# Patient Record
Sex: MALE | Race: WHITE | NOT HISPANIC OR LATINO | Employment: FULL TIME | URBAN - METROPOLITAN AREA
[De-identification: names, ages, dates, MRNs, and addresses within clinical notes are randomized per-mention and may not be internally consistent; named-entity substitution may affect disease eponyms.]

---

## 2023-09-22 ENCOUNTER — TELEPHONE (OUTPATIENT)
Dept: UROLOGY | Facility: CLINIC | Age: 23
End: 2023-09-22
Payer: COMMERCIAL

## 2023-09-22 NOTE — TELEPHONE ENCOUNTER
Call was placed to patient informing him did he complete any SA. He stated he has completed 2 SA at [facility] in [place].

## 2023-09-22 NOTE — TELEPHONE ENCOUNTER
----- Message from Aaron Garcia sent at 9/22/2023  9:47 AM CDT -----  Regarding: returning call  Contact: pt  Pt requesting call back RE: returning call to staff      Confirmed contact below:  Contact Name:Zhao Sy  Phone Number: [phone]

## 2023-09-27 ENCOUNTER — OFFICE VISIT (OUTPATIENT)
Dept: UROLOGY | Facility: CLINIC | Age: 23
End: 2023-09-27
Payer: COMMERCIAL

## 2023-09-27 ENCOUNTER — LAB VISIT (OUTPATIENT)
Dept: LAB | Facility: HOSPITAL | Age: 23
End: 2023-09-27
Attending: UROLOGY
Payer: COMMERCIAL

## 2023-09-27 VITALS
WEIGHT: 165.38 LBS | SYSTOLIC BLOOD PRESSURE: 108 MMHG | HEART RATE: 64 BPM | DIASTOLIC BLOOD PRESSURE: 70 MMHG | HEIGHT: 70 IN | BODY MASS INDEX: 23.68 KG/M2

## 2023-09-27 DIAGNOSIS — E29.1 TESTICULAR FAILURE: ICD-10-CM

## 2023-09-27 DIAGNOSIS — I86.1 VARICOCELE: ICD-10-CM

## 2023-09-27 DIAGNOSIS — E29.1 TESTICULAR FAILURE: Primary | ICD-10-CM

## 2023-09-27 LAB
ESTRADIOL SERPL-MCNC: 25 PG/ML (ref 11–44)
FSH SERPL-ACNC: 7.23 MIU/ML (ref 0.95–11.95)
LH SERPL-ACNC: 2.8 MIU/ML (ref 0.6–12.1)
PROLACTIN SERPL IA-MCNC: 7.5 NG/ML (ref 3.5–19.4)
TESTOST SERPL-MCNC: 628 NG/DL (ref 304–1227)

## 2023-09-27 PROCEDURE — 3008F PR BODY MASS INDEX (BMI) DOCUMENTED: ICD-10-PCS | Mod: CPTII,S$GLB,, | Performed by: UROLOGY

## 2023-09-27 PROCEDURE — 3074F SYST BP LT 130 MM HG: CPT | Mod: CPTII,S$GLB,, | Performed by: UROLOGY

## 2023-09-27 PROCEDURE — 83002 ASSAY OF GONADOTROPIN (LH): CPT | Performed by: UROLOGY

## 2023-09-27 PROCEDURE — 84403 ASSAY OF TOTAL TESTOSTERONE: CPT | Performed by: UROLOGY

## 2023-09-27 PROCEDURE — 83001 ASSAY OF GONADOTROPIN (FSH): CPT | Performed by: UROLOGY

## 2023-09-27 PROCEDURE — 36415 COLL VENOUS BLD VENIPUNCTURE: CPT | Performed by: UROLOGY

## 2023-09-27 PROCEDURE — 84146 ASSAY OF PROLACTIN: CPT | Performed by: UROLOGY

## 2023-09-27 PROCEDURE — 1159F MED LIST DOCD IN RCRD: CPT | Mod: CPTII,S$GLB,, | Performed by: UROLOGY

## 2023-09-27 PROCEDURE — 1159F PR MEDICATION LIST DOCUMENTED IN MEDICAL RECORD: ICD-10-PCS | Mod: CPTII,S$GLB,, | Performed by: UROLOGY

## 2023-09-27 PROCEDURE — 3074F PR MOST RECENT SYSTOLIC BLOOD PRESSURE < 130 MM HG: ICD-10-PCS | Mod: CPTII,S$GLB,, | Performed by: UROLOGY

## 2023-09-27 PROCEDURE — 3008F BODY MASS INDEX DOCD: CPT | Mod: CPTII,S$GLB,, | Performed by: UROLOGY

## 2023-09-27 PROCEDURE — 99999 PR PBB SHADOW E&M-EST. PATIENT-LVL III: CPT | Mod: PBBFAC,,, | Performed by: UROLOGY

## 2023-09-27 PROCEDURE — 99204 PR OFFICE/OUTPT VISIT, NEW, LEVL IV, 45-59 MIN: ICD-10-PCS | Mod: S$GLB,,, | Performed by: UROLOGY

## 2023-09-27 PROCEDURE — 99999 PR PBB SHADOW E&M-EST. PATIENT-LVL III: ICD-10-PCS | Mod: PBBFAC,,, | Performed by: UROLOGY

## 2023-09-27 PROCEDURE — 3078F PR MOST RECENT DIASTOLIC BLOOD PRESSURE < 80 MM HG: ICD-10-PCS | Mod: CPTII,S$GLB,, | Performed by: UROLOGY

## 2023-09-27 PROCEDURE — 3078F DIAST BP <80 MM HG: CPT | Mod: CPTII,S$GLB,, | Performed by: UROLOGY

## 2023-09-27 PROCEDURE — 99204 OFFICE O/P NEW MOD 45 MIN: CPT | Mod: S$GLB,,, | Performed by: UROLOGY

## 2023-09-27 PROCEDURE — 82670 ASSAY OF TOTAL ESTRADIOL: CPT | Performed by: UROLOGY

## 2023-09-27 NOTE — LETTER
September 27, 2023        Perez Bailey MD  [address] [place] [address] Dr Yasmany MAKI [id number]             [facility]  [address]  [place] [address]  Phone: [phone]   Patient: Zhao Sy   MR Number: [id number]   YOB: 2000   Date of Visit: 9/27/2023       Dear Dr. Bailey:    Thank you for referring Zhao Sy to me for evaluation. Attached you will find relevant portions of my assessment and plan of care.    If you have questions, please do not hesitate to call me. I look forward to following Zhao Sy along with you.    Sincerely,      Igor Fortune MD            CC  No Recipients    Enclosure

## 2023-09-27 NOTE — H&P (VIEW-ONLY)
"Chief Complaint:  Infertility    HPI:    Mr. Sy is a 23 y.o.  male who has been  to his wife for the past 3 years. They have been trying to achieve a pregnancy for the past 1 years but without success. 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 He denies a history of erectile dysfunction and ejaculatory problems.    He has achieved 0 pregnancies in the past.    SA 23 (EVAN)--4.6 cc/8.55 million per cc/60%/4%  SA 23 (EVAN)--6.6 cc/7.2 million per cc/41%/4%    Sammie Sy is 24 years old. ( 99) Her menses are regular. She has not undergone prior hysterosalpingogram. She has achieved 0 prior pregnancies.  She sees Dr. Bailey.    The couple has not undergone prior intrauterine insemination procedures.    The couple has not undergone prior in-vitro fertilization procedures.    Zhao Sy denies a history of exposure to harmful chemicals, toxins, and radiation.    No history of recent fevers greater than 101.5 degrees Farenheit.    No history of recent exposure to "wet heat."    No history of urological trauma or testicular torsion.    No history of prostatitis, epididymitis, and orchitis.    No history of post-pubertal mumps.    There is no known family history of fertility problems.    REVIEW OF SYSTEMS:     He denies headache, blurred vision, fever, nausea, vomiting, chills, abdominal pain, chest pain, sore throat, bleeding per rectum, cough, SOB, recent loss of consciousness, recent mental status changes, seizures, dizziness, or upper or lower extremity weakness.    PHYSICAL EXAM:     The patient generally appears in good health, is appropriately interactive, and is in no apparent distress.     Eyes: anicteric sclerae, moist conjunctivae; no lid-lag; PERRLA     HENT: Atraumatic; oropharynx clear with moist mucous membranes and no mucosal ulcerations;normal hard and soft palate.  No evidence of " "lymphadenopathy.    Neck: Trachea midline.  No thyromegaly.    Skin: No lesions.    Mental: Cooperative with normal affect.  Is oriented to time, place, and person.    Neuro: Grossly intact.    Chest: Normal inspiratory effort.   No accessory muscles.  No audible wheezes.  Respirations symmetric on inspiration and expiration.    Heart: Regular rhythm.      Abdomen:  Soft, non-tender. No masses or organomegaly. Bladder is not palpable. No evidence of flank discomfort. No evidence of inguinal hernia.    Genitourinary: Penis is normal with no evidence of plaques or induration. Urethral meatus is normal. Scrotum is normal. Testes are descended bilaterally with no evidence of abnormal masses or tenderness. Epididymis, vas deferens, and cord structures are normal bilaterally.  Testicular volume is approximately 18 cc on the R and 17 cc on the L.  He a L grade II varicocele.    Extremities: No cyanosis, clubbing, or edema.    IMPRESSION & PLAN:    Mr. Sy is a 23 y.o.  male who has been  to his wife for the past 3 years. They have been trying to achieve a pregnancy for the past 1 years but without success. 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 He denies a history of erectile dysfunction and ejaculatory problems.    He has achieved 0 pregnancies in the past.    SA 7/20/23 (EVAN)--4.6 cc/8.55 million per cc/60%/4%  SA 9/12/23 (EVAN)--6.6 cc/7.2 million per cc/41%/4%    He a L grade II varicocele.    1.  FSH, LH, testosterone, prolactin, and estradiol serum levels today.  Will phone review.  2.  Independently interpreted his outside SA's today.   3.  Discussed varicocele's potential impact on fertility.  Recommend correction.  4.  Recommend avoiding "wet heat."  5.  Recommend taking a multivitamin and 500 mg of vitamin c daily in addition to the multivitamin.  6.  Please send a copy of the note to Dr. Bailey.  Thank you for the " consultation.  7. We discussed the risks and benefits of varicocele repair.  We specifically addressed the chance of failure to improve semen parameters, bleeding, infection, pain, loss of testicle, damage to the vas.  We discussed this amongst other risks and discussed alternatives.  He was given the chance to ask questions.  Will schedule for elective varicocele repair.      CC: Lynn

## 2023-10-02 ENCOUNTER — TELEPHONE (OUTPATIENT)
Dept: UROLOGY | Facility: CLINIC | Age: 23
End: 2023-10-02
Payer: COMMERCIAL

## 2023-10-02 DIAGNOSIS — I86.1 VARICOCELE: Primary | ICD-10-CM

## 2023-10-09 NOTE — PRE-PROCEDURE INSTRUCTIONS
The following was discussed with pt via phone and sent to pt portal. Pt verbalized understanding.    Dear Zhao ,    You are scheduled for a procedure with Dr. Fortune on 10/10/2023. Your scheduled arrival time is 5:15am.  This arrival time is roughly 2 hours before your anticipated procedure time to allow sufficient time for pre-op.  Please wear comfortable clothes.  This procedure will take place at the [facility] at the corner of [facility].  [facility] [place] [facility] to [facility].  The address is:    [address] [place] [address].  [place] [address]    After entering the building, you will proceed to the second floor where you can check in with registration. You should take any medications that you routinely take for blood pressure (other than those listed below), heart medications, thyroid, cholesterol, etc.     If you wear contact lenses, please wear glasses to your procedure.    Your fasting instructions are as follow:  Nothing to eat or drink after midnight tonight. You may have small amounts of water to take medications in the morning. You MUST have a responsible adult to bring you home.      This evening (10/9/2023) and tomorrow morning, please hold the following medications:  -Aspirin and Aspirin-containing products (Goody's powder, Excedrin)  -NSAIDs (Advil, Ibuprofen, Aleve, Diclofenac)  -Vitamins/Supplements  -Herbal remedies/Teas  -Stimulants (Adderall, Vyvanse, Adipex)  -Diabetic medication (Please bring with you day of procedure)    -May take Tylenol    This evening (10/9/2023) and tomorrow morning, take a shower using antibacterial soap (ex: Hibiclens or Dial antibacterial soap). DO NOT apply deodorant, lotion, cologne, or anything else to the skin. Wear loose, comfortable fitting clothing. Do not wear jewelry or bring any valuables with you. If you wear dentures or contacts, please bring your case with you or leave them at home.    If you have  any procedure-specific questions, please call your surgeon's office. Any other questions, don't hesitate to call at [phone].    Thanks,  ARMAAN Cai  Pre-Admit Dept [facility]

## 2023-10-10 ENCOUNTER — HOSPITAL ENCOUNTER (OUTPATIENT)
Facility: HOSPITAL | Age: 23
Discharge: HOME | End: 2023-10-10
Attending: UROLOGY | Admitting: UROLOGY
Payer: COMMERCIAL

## 2023-10-10 ENCOUNTER — ANESTHESIA (OUTPATIENT)
Dept: SURGERY | Facility: HOSPITAL | Age: 23
End: 2023-10-10
Payer: COMMERCIAL

## 2023-10-10 ENCOUNTER — ANESTHESIA EVENT (OUTPATIENT)
Dept: SURGERY | Facility: HOSPITAL | Age: 23
End: 2023-10-10
Payer: COMMERCIAL

## 2023-10-10 VITALS
DIASTOLIC BLOOD PRESSURE: 65 MMHG | TEMPERATURE: 99 F | RESPIRATION RATE: 13 BRPM | HEART RATE: 69 BPM | OXYGEN SATURATION: 100 % | SYSTOLIC BLOOD PRESSURE: 125 MMHG

## 2023-10-10 DIAGNOSIS — R86.9 ABNORMAL SEMEN ANALYSIS: ICD-10-CM

## 2023-10-10 DIAGNOSIS — I86.1 VARICOCELE: Primary | ICD-10-CM

## 2023-10-10 PROCEDURE — 63600175 PHARM REV CODE 636 W HCPCS: Performed by: NURSE ANESTHETIST, CERTIFIED REGISTERED

## 2023-10-10 PROCEDURE — 99900035 HC TECH TIME PER 15 MIN (STAT)

## 2023-10-10 PROCEDURE — 25000003 PHARM REV CODE 250: Performed by: NURSE ANESTHETIST, CERTIFIED REGISTERED

## 2023-10-10 PROCEDURE — D9220A PRA ANESTHESIA: ICD-10-PCS | Mod: ,,, | Performed by: NURSE ANESTHETIST, CERTIFIED REGISTERED

## 2023-10-10 PROCEDURE — 76998 US GUIDE INTRAOP: CPT | Mod: 26,,, | Performed by: UROLOGY

## 2023-10-10 PROCEDURE — 37000009 HC ANESTHESIA EA ADD 15 MINS: Performed by: UROLOGY

## 2023-10-10 PROCEDURE — 36000706: Performed by: UROLOGY

## 2023-10-10 PROCEDURE — 37000008 HC ANESTHESIA 1ST 15 MINUTES: Performed by: UROLOGY

## 2023-10-10 PROCEDURE — 55530 PR EXCISE VARICOCELE: ICD-10-PCS | Mod: LT,,, | Performed by: UROLOGY

## 2023-10-10 PROCEDURE — D9220A PRA ANESTHESIA: Mod: ,,, | Performed by: NURSE ANESTHETIST, CERTIFIED REGISTERED

## 2023-10-10 PROCEDURE — 76998 PR  ULTRASONIC GUIDANCE, INTRAOPERATIVE: ICD-10-PCS | Mod: 26,,, | Performed by: UROLOGY

## 2023-10-10 PROCEDURE — 71000015 HC POSTOP RECOV 1ST HR: Performed by: UROLOGY

## 2023-10-10 PROCEDURE — 25000003 PHARM REV CODE 250: Performed by: UROLOGY

## 2023-10-10 PROCEDURE — 71000033 HC RECOVERY, INTIAL HOUR: Performed by: UROLOGY

## 2023-10-10 PROCEDURE — 36000707: Performed by: UROLOGY

## 2023-10-10 PROCEDURE — 55530 REVISE SPERMATIC CORD VEINS: CPT | Mod: LT,,, | Performed by: UROLOGY

## 2023-10-10 PROCEDURE — 94761 N-INVAS EAR/PLS OXIMETRY MLT: CPT

## 2023-10-10 RX ORDER — DEXAMETHASONE SODIUM PHOSPHATE 4 MG/ML
INJECTION, SOLUTION INTRA-ARTICULAR; INTRALESIONAL; INTRAMUSCULAR; INTRAVENOUS; SOFT TISSUE
Status: DISCONTINUED | OUTPATIENT
Start: 2023-10-10 | End: 2023-10-10

## 2023-10-10 RX ORDER — ROCURONIUM BROMIDE 10 MG/ML
INJECTION, SOLUTION INTRAVENOUS
Status: DISCONTINUED | OUTPATIENT
Start: 2023-10-10 | End: 2023-10-10

## 2023-10-10 RX ORDER — LIDOCAINE HYDROCHLORIDE 10 MG/ML
INJECTION, SOLUTION EPIDURAL; INFILTRATION; INTRACAUDAL; PERINEURAL
Status: DISCONTINUED | OUTPATIENT
Start: 2023-10-10 | End: 2023-10-10 | Stop reason: HOSPADM

## 2023-10-10 RX ORDER — FENTANYL CITRATE 50 UG/ML
INJECTION, SOLUTION INTRAMUSCULAR; INTRAVENOUS
Status: DISCONTINUED | OUTPATIENT
Start: 2023-10-10 | End: 2023-10-10

## 2023-10-10 RX ORDER — CEFAZOLIN SODIUM 1 G/3ML
INJECTION, POWDER, FOR SOLUTION INTRAMUSCULAR; INTRAVENOUS
Status: DISCONTINUED | OUTPATIENT
Start: 2023-10-10 | End: 2023-10-10

## 2023-10-10 RX ORDER — KETAMINE HCL IN 0.9 % NACL 50 MG/5 ML
SYRINGE (ML) INTRAVENOUS
Status: DISCONTINUED | OUTPATIENT
Start: 2023-10-10 | End: 2023-10-10

## 2023-10-10 RX ORDER — SODIUM CHLORIDE 0.9 % (FLUSH) 0.9 %
3 SYRINGE (ML) INJECTION
Status: DISCONTINUED | OUTPATIENT
Start: 2023-10-10 | End: 2023-10-10 | Stop reason: HOSPADM

## 2023-10-10 RX ORDER — LIDOCAINE HYDROCHLORIDE 20 MG/ML
INJECTION INTRAVENOUS
Status: DISCONTINUED | OUTPATIENT
Start: 2023-10-10 | End: 2023-10-10

## 2023-10-10 RX ORDER — PROPOFOL 10 MG/ML
VIAL (ML) INTRAVENOUS
Status: DISCONTINUED | OUTPATIENT
Start: 2023-10-10 | End: 2023-10-10

## 2023-10-10 RX ORDER — HYDROMORPHONE HYDROCHLORIDE 1 MG/ML
0.2 INJECTION, SOLUTION INTRAMUSCULAR; INTRAVENOUS; SUBCUTANEOUS EVERY 5 MIN PRN
Status: DISCONTINUED | OUTPATIENT
Start: 2023-10-10 | End: 2023-10-10 | Stop reason: HOSPADM

## 2023-10-10 RX ORDER — ONDANSETRON 2 MG/ML
4 INJECTION INTRAMUSCULAR; INTRAVENOUS ONCE AS NEEDED
Status: DISCONTINUED | OUTPATIENT
Start: 2023-10-10 | End: 2023-10-10 | Stop reason: HOSPADM

## 2023-10-10 RX ORDER — OXYCODONE HYDROCHLORIDE 5 MG/1
5 TABLET ORAL
Status: DISCONTINUED | OUTPATIENT
Start: 2023-10-10 | End: 2023-10-10 | Stop reason: HOSPADM

## 2023-10-10 RX ORDER — CEFAZOLIN SODIUM 1 G/3ML
2 INJECTION, POWDER, FOR SOLUTION INTRAMUSCULAR; INTRAVENOUS
Status: ACTIVE | OUTPATIENT
Start: 2023-10-10

## 2023-10-10 RX ORDER — DEXMEDETOMIDINE HYDROCHLORIDE 100 UG/ML
INJECTION, SOLUTION INTRAVENOUS
Status: DISCONTINUED | OUTPATIENT
Start: 2023-10-10 | End: 2023-10-10

## 2023-10-10 RX ORDER — MIDAZOLAM HYDROCHLORIDE 1 MG/ML
INJECTION, SOLUTION INTRAMUSCULAR; INTRAVENOUS
Status: DISCONTINUED | OUTPATIENT
Start: 2023-10-10 | End: 2023-10-10

## 2023-10-10 RX ORDER — PHENYLEPHRINE HCL IN 0.9% NACL 1 MG/10 ML
SYRINGE (ML) INTRAVENOUS
Status: DISCONTINUED | OUTPATIENT
Start: 2023-10-10 | End: 2023-10-10

## 2023-10-10 RX ORDER — EPHEDRINE SULFATE 50 MG/ML
INJECTION, SOLUTION INTRAVENOUS
Status: DISCONTINUED | OUTPATIENT
Start: 2023-10-10 | End: 2023-10-10

## 2023-10-10 RX ORDER — ONDANSETRON 2 MG/ML
INJECTION INTRAMUSCULAR; INTRAVENOUS
Status: DISCONTINUED | OUTPATIENT
Start: 2023-10-10 | End: 2023-10-10

## 2023-10-10 RX ORDER — HYDROCODONE BITARTRATE AND ACETAMINOPHEN 5; 325 MG/1; MG/1
1 TABLET ORAL EVERY 6 HOURS PRN
Qty: 5 TABLET | Refills: 0 | Status: SHIPPED | OUTPATIENT
Start: 2023-10-10 | End: 2024-02-19

## 2023-10-10 RX ORDER — MEPERIDINE HYDROCHLORIDE 50 MG/ML
12.5 INJECTION INTRAMUSCULAR; INTRAVENOUS; SUBCUTANEOUS ONCE AS NEEDED
Status: DISCONTINUED | OUTPATIENT
Start: 2023-10-10 | End: 2023-10-10 | Stop reason: HOSPADM

## 2023-10-10 RX ADMIN — CEFAZOLIN 2 G: 330 INJECTION, POWDER, FOR SOLUTION INTRAMUSCULAR; INTRAVENOUS at 07:10

## 2023-10-10 RX ADMIN — PROPOFOL 190 MG: 10 INJECTION, EMULSION INTRAVENOUS at 07:10

## 2023-10-10 RX ADMIN — DEXMEDETOMIDINE 8 MCG: 100 INJECTION, SOLUTION INTRAVENOUS at 08:10

## 2023-10-10 RX ADMIN — LIDOCAINE HYDROCHLORIDE 20 MG: 20 INJECTION INTRAVENOUS at 08:10

## 2023-10-10 RX ADMIN — DEXAMETHASONE SODIUM PHOSPHATE 8 MG: 4 INJECTION INTRA-ARTICULAR; INTRALESIONAL; INTRAMUSCULAR; INTRAVENOUS; SOFT TISSUE at 07:10

## 2023-10-10 RX ADMIN — ROCURONIUM BROMIDE 40 MG: 10 INJECTION, SOLUTION INTRAVENOUS at 07:10

## 2023-10-10 RX ADMIN — EPHEDRINE SULFATE 10 MG: 50 INJECTION INTRAVENOUS at 07:10

## 2023-10-10 RX ADMIN — Medication 100 MCG: at 07:10

## 2023-10-10 RX ADMIN — SODIUM CHLORIDE: 0.9 INJECTION, SOLUTION INTRAVENOUS at 06:10

## 2023-10-10 RX ADMIN — LIDOCAINE HYDROCHLORIDE 80 MG: 20 INJECTION INTRAVENOUS at 07:10

## 2023-10-10 RX ADMIN — SUGAMMADEX 150 MG: 100 INJECTION, SOLUTION INTRAVENOUS at 08:10

## 2023-10-10 RX ADMIN — FENTANYL CITRATE 50 MCG: 50 INJECTION INTRAMUSCULAR; INTRAVENOUS at 07:10

## 2023-10-10 RX ADMIN — ROCURONIUM BROMIDE 20 MG: 10 INJECTION, SOLUTION INTRAVENOUS at 07:10

## 2023-10-10 RX ADMIN — DEXMEDETOMIDINE 12 MCG: 100 INJECTION, SOLUTION INTRAVENOUS at 06:10

## 2023-10-10 RX ADMIN — Medication 50 MCG: at 07:10

## 2023-10-10 RX ADMIN — MIDAZOLAM 2 MG: 1 INJECTION INTRAMUSCULAR; INTRAVENOUS at 06:10

## 2023-10-10 RX ADMIN — EPHEDRINE SULFATE 5 MG: 50 INJECTION INTRAVENOUS at 07:10

## 2023-10-10 RX ADMIN — Medication 20 MG: at 06:10

## 2023-10-10 RX ADMIN — ONDANSETRON 4 MG: 2 INJECTION INTRAMUSCULAR; INTRAVENOUS at 06:10

## 2023-10-10 NOTE — INTERVAL H&P NOTE
The patient has been examined and the H&P has been reviewed:    I concur with the findings and no changes have occurred since H&P was written.    Surgery risks, benefits and alternative options discussed and understood by patient/family.    No ASA, NSAID use in >7 days.     Patient was assessed preoperatively, found to be appropriate in behavior. The risks, benefits, and alternatives to procedures were discussed, and questions were answered. Plan to proceed with described procedure.    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

## 2023-10-10 NOTE — OP NOTE
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 male with infertility and a left varicocele.  Semen analysis showed oligoasthenospermia, oligoasthenoteratospermia.  After discussion with the patient, he has elected to undergo surgical correction of his varicocele.      Findings: left grade 2 varicocele    Estimated Blood Loss: <5mL    Drains: none    Procedure in Detail:  After informed consent was obtained, the patient was transferred to the operating room and placed in the supine position. Anesthesia was administered. The microscope was positioned and focused prior to prepping and draping. He was then shaved, prepped and draped in the usual sterile fashion. A marking pen was used to giuliana the skin over the subinguinal area over the left spermatic cord. This giuliana was incised with a 15 blade, creating a transverse skin incision along Aster's lines measuring approximately 3 cm. A hemostat was used to elevate the subcutaneous tissues. Bovie cautery was used to dissect down through Camper's and Jerry's fascia. Blunt dissection was then used to dissect down to the fascia. The spermatic cord was identified and with blunt dissection was freed circumferentially and delivered through the incision. Cremasteric fibers were released from the cord structures. A penrose drain was placed underneath the spermatic cord.    The operating Leica microscope was brought into the operative field. The vas deferens was identified. The surgeon's hand was used to compress the vas  deferens and provide traction throughout the procedure. John forceps were used to dissect away the layers of the spermatic cord fascia. In this fashion, the internal spermatic veins were identified and dissected using He hemostats and John forceps. The veins were ligated using 3-0 silk ties. The testicular artery was identified visually as well as with Doppler ultrasound. It was preserved throughout the case and verified each time before tying off a vein. Vasal vessels and lymphatics were also preserved. Careful inspection of the cord showed no other veins requiring ligation.    Hemostasis was confirmed, the penrose was removed, and the cord was placed back into its normal anatomic position. The incision was irrigated with normal saline. 3-0 Vicryl suture was used in a running fashion to close the deep dermal layer. 4-0 monocryl running subcuticular suture was used to close the skin. Sterile dressing was applied using steri strips and island dressing. The patient was awakened and transferred to the recovery room in stable condition.    Post Operative Plan  The patient will be discharged home today.  He is to refrain from any heavy lifting or exercise for 2 weeks time.  He will follow up with Dr. Fortune in 4 weeks time.

## 2023-10-10 NOTE — ANESTHESIA PREPROCEDURE EVALUATION
10/10/2023  Zhao Sy is a 23 y.o., male.    [facility]  Anesthesia Pre-Operative Evaluation       Patient Name: Zhao Sy  YOB: 2000  MRN: [id number]  CSN: [id number]      Code Status: No Order   Date of Procedure: 10/10/2023  Anesthesia: General Procedure: Procedure(s) (LRB):  EXCISION, VARICOCELE (N/A)  Pre-Operative Diagnosis: Varicocele [I86.1]  Proceduralist: Surgeon(s) and Role:     * Igor Fortune MD - Primary        SUBJECTIVE:   Zhao Sy is a 23 y.o. male who  has no past medical history on file..     he is not on any long-term medications.     ALLERGIES:   Review of patient's allergies indicates:  No Known Allergies  LDA:          Lines/Drains/Airways     Peripheral Intravenous Line  Duration                Peripheral IV - Single Lumen 10/10/23 0600 20 G Left;Posterior Hand <1 day              Anesthesia Evaluation   MEDICATIONS:     Antibiotics (From admission, onward)    Start     Stop Route Frequency Ordered    10/10/23 0524  ceFAZolin injection 2 g         -- IV On Call Procedure 10/10/23 0524        VTE Risk Mitigation (From admission, onward)         Ordered     IP VTE LOW RISK PATIENT  Once         10/10/23 0524     Place JUAN ANTONIO hose  Until discontinued         10/10/23 0524     Place sequential compression device  Until discontinued         10/10/23 0524                  Current Facility-Administered Medications   Medication Dose Route Frequency Provider Last Rate Last Admin    ceFAZolin injection 2 g  2 g Intravenous On Call Procedure Dk Bean MD              History:   There are no hospital problems to display for this patient.    Surgical History:    has no past surgical history on file.   Social History:    reports being sexually active and has had partner(s) who are female.  reports that he has never smoked. He has never used  "smokeless tobacco. He reports that he does not drink alcohol and does not use drugs.     OBJECTIVE:     Vital Signs (Most Recent):  Temp: 36.6 °C (97.9 °F) (10/10/23 0559)  Pulse: 62 (10/10/23 0559)  Resp: 18 (10/10/23 0559)  BP: 126/71 (10/10/23 0559)  SpO2: 100 % (10/10/23 0559) Vital Signs Range (Last 24H):  Temp:  [36.6 °C (97.9 °F)]   Pulse:  [62-70]   Resp:  [16-18]   BP: (126)/(71)   SpO2:  [100 %]        There is no height or weight on file to calculate BMI.   Wt Readings from Last 4 Encounters:   09/27/23 75 kg (165 lb 5.5 oz)       Significant Labs:  No results found for: "WBC", "HGB", "HCT", "PLT", "NA", "K", "CL", "CREATININE", "BUN", "CO2", "GLU", "CALCIUM", "MG", "PHOS", "ALKPHOS", "ALT", "AST", "ALBUMIN", "PT", "INR", "PROTIME", "APTT", "GLUF", "HGBA1C", "MICROALBUR", "CPK", "CPKMB", "TROPONINI", "MB", "BNP", "PREGTESTUR", "PREGSERUM", "HCG", "HCGQUANT"  No LMP for male patient.  No results found for this or any previous visit (from the past 72 hour(s)).    EKG:   No results found for this or any previous visit.    TTE:  No results found for this or any previous visit.  No results found for: "EF"   No results found for this or any previous visit.  NNEKA:  No results found for this or any previous visit.  Stress Test:  No results found for this or any previous visit.     LHC:  No results found for this or any previous visit.     PFT:  No results found for: "FEV1", "FVC", "UZX3KQA", "TLC", "DLCO"     ASSESSMENT/PLAN:     Pre-op Assessment    I have reviewed the Patient Summary Reports.     I have reviewed the Nursing Notes. I have reviewed the NPO Status.   I have reviewed the Medications.     Review of Systems  Anesthesia Hx:  No problems with previous Anesthesia  Denies Family Hx of Anesthesia complications.   Denies Personal Hx of Anesthesia complications.   Hematology/Oncology:  Hematology Normal   Oncology Normal     EENT/Dental:EENT/Dental Normal   Cardiovascular:  Cardiovascular Normal   "   Pulmonary:  Pulmonary Normal    Renal/:  Renal/ Normal  Varicocele   Hepatic/GI:  Hepatic/GI Normal    Musculoskeletal:  Musculoskeletal Normal    Neurological:  Neurology Normal    Endocrine:  Endocrine Normal    Dermatological:  Skin Normal    Psych:  Psychiatric Normal              Anesthesia Plan  Type of Anesthesia, risks & benefits discussed:    Anesthesia Type: Gen ETT  Intra-op Monitoring Plan: Standard ASA Monitors  Post Op Pain Control Plan: multimodal analgesia  Induction:  IV  Airway Plan: Video  Informed Consent: Informed consent signed with the Patient and all parties understand the risks and agree with anesthesia plan.  All questions answered. Patient consented to blood products? No  ASA Score: 1  Day of Surgery Review of History & Physical: H&P Update referred to the surgeon/provider.    Ready For Surgery From Anesthesia Perspective.     .

## 2023-10-10 NOTE — PLAN OF CARE
Pt in preop bay 41, VSS and IV inserted. Pt denies any open wounds on body or the use of any weight loss injections. Pt needs updated H&P and consents , otherwise ready to roll.

## 2023-10-10 NOTE — BRIEF OP NOTE
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    DISPOSITION: Home or Self Care    FINAL DIAGNOSIS:  Varicocele    FOLLOWUP: In clinic    DISCHARGE INSTRUCTIONS:    Discharge Procedure Orders   Notify your health care provider if you experience any of the following:  temperature >100.4     Notify your health care provider if you experience any of the following:  persistent nausea and vomiting or diarrhea     Notify your health care provider if you experience any of the following:  severe uncontrolled pain     Notify your health care provider if you experience any of the following:  difficulty breathing or increased cough     Notify your health care provider if you experience any of the following:  severe persistent headache     Notify your health care provider if you experience any of the following:  persistent dizziness, light-headedness, or visual disturbances     Notify your health care provider if you experience any of the following:  increased confusion or weakness

## 2023-10-10 NOTE — ANESTHESIA PROCEDURE NOTES
Intubation    Date/Time: 10/10/2023 7:04 AM    Performed by: Jacoby Crawford CRNA  Authorized by: Josh Rene Jr., MD    Intubation:     Induction:  Intravenous    Intubated:  Postinduction    Mask Ventilation:  Easy mask    Attempts:  1    Method of Intubation:  Video laryngoscopy    Blade:  Wallis 3    Laryngeal View Grade: Grade I - full view of cords      Difficult Airway Encountered?: No      Complications:  None    Airway Device Size:  7.5    Style/Cuff Inflation:  Cuffed (inflated to minimal occlusive pressure)    Inflation Amount (mL):  6    Tube secured:  23    Secured at:  The lips    Placement Verified By:  Capnometry    Complicating Factors:  None    Findings Post-Intubation:  BS equal bilateral and atraumatic/condition of teeth unchanged

## 2023-10-10 NOTE — ANESTHESIA POSTPROCEDURE EVALUATION
Anesthesia Post Evaluation    Patient: Zhao Sy    Procedure(s) Performed: Procedure(s) (LRB):  EXCISION, VARICOCELE (Left)    Final Anesthesia Type: general      Patient location during evaluation: PACU  Patient participation: Yes- Able to Participate  Level of consciousness: awake and alert  Post-procedure vital signs: reviewed and stable  Pain management: adequate  Airway patency: patent    PONV status at discharge: No PONV  Anesthetic complications: no      Cardiovascular status: stable  Respiratory status: spontaneous ventilation  Hydration status: euvolemic  Follow-up not needed.          Vitals Value Taken Time   /65 10/10/23 0915   Temp 37.1 °C (98.8 °F) 10/10/23 0830   Pulse 69 10/10/23 0915   Resp 13 10/10/23 0915   SpO2 100 % 10/10/23 0915         Event Time   Out of Recovery 09:00:00         Pain/Candice Score: Candice Score: 10 (10/10/2023  8:45 AM)

## 2023-10-10 NOTE — TRANSFER OF CARE
Anesthesia Transfer of Care Note    Patient: Zhao Sy    Procedure(s) Performed: Procedure(s) (LRB):  EXCISION, VARICOCELE (Left)    Patient location: PACU    Anesthesia Type: general    Transport from OR: Transported from OR on 6-10 L/min O2 by face mask with adequate spontaneous ventilation    Post pain: adequate analgesia    Post assessment: no apparent anesthetic complications    Post vital signs: stable    Level of consciousness: sedated    Nausea/Vomiting: no nausea/vomiting    Complications: none          Last vitals:   Visit Vitals  /71   Pulse 62   Temp 36.6 °C (97.9 °F) (Temporal)   Resp 18   SpO2 100%

## 2023-11-08 ENCOUNTER — OFFICE VISIT (OUTPATIENT)
Dept: UROLOGY | Facility: CLINIC | Age: 23
End: 2023-11-08
Payer: COMMERCIAL

## 2023-11-08 ENCOUNTER — TELEPHONE (OUTPATIENT)
Dept: UROLOGY | Facility: CLINIC | Age: 23
End: 2023-11-08
Payer: COMMERCIAL

## 2023-11-08 VITALS
DIASTOLIC BLOOD PRESSURE: 68 MMHG | HEIGHT: 70 IN | BODY MASS INDEX: 24.05 KG/M2 | WEIGHT: 168 LBS | HEART RATE: 66 BPM | SYSTOLIC BLOOD PRESSURE: 119 MMHG

## 2023-11-08 DIAGNOSIS — I86.1 VARICOCELE: ICD-10-CM

## 2023-11-08 DIAGNOSIS — E29.1 TESTICULAR FAILURE: Primary | ICD-10-CM

## 2023-11-08 PROCEDURE — 1159F MED LIST DOCD IN RCRD: CPT | Mod: CPTII,S$GLB,, | Performed by: UROLOGY

## 2023-11-08 PROCEDURE — 99999 PR PBB SHADOW E&M-EST. PATIENT-LVL III: CPT | Mod: PBBFAC,,, | Performed by: UROLOGY

## 2023-11-08 PROCEDURE — 1160F PR REVIEW ALL MEDS BY PRESCRIBER/CLIN PHARMACIST DOCUMENTED: ICD-10-PCS | Mod: CPTII,S$GLB,, | Performed by: UROLOGY

## 2023-11-08 PROCEDURE — 3078F DIAST BP <80 MM HG: CPT | Mod: CPTII,S$GLB,, | Performed by: UROLOGY

## 2023-11-08 PROCEDURE — 1159F PR MEDICATION LIST DOCUMENTED IN MEDICAL RECORD: ICD-10-PCS | Mod: CPTII,S$GLB,, | Performed by: UROLOGY

## 2023-11-08 PROCEDURE — 3074F SYST BP LT 130 MM HG: CPT | Mod: CPTII,S$GLB,, | Performed by: UROLOGY

## 2023-11-08 PROCEDURE — 3078F PR MOST RECENT DIASTOLIC BLOOD PRESSURE < 80 MM HG: ICD-10-PCS | Mod: CPTII,S$GLB,, | Performed by: UROLOGY

## 2023-11-08 PROCEDURE — 99999 PR PBB SHADOW E&M-EST. PATIENT-LVL III: ICD-10-PCS | Mod: PBBFAC,,, | Performed by: UROLOGY

## 2023-11-08 PROCEDURE — 3074F PR MOST RECENT SYSTOLIC BLOOD PRESSURE < 130 MM HG: ICD-10-PCS | Mod: CPTII,S$GLB,, | Performed by: UROLOGY

## 2023-11-08 PROCEDURE — 1160F RVW MEDS BY RX/DR IN RCRD: CPT | Mod: CPTII,S$GLB,, | Performed by: UROLOGY

## 2023-11-08 PROCEDURE — 99024 PR POST-OP FOLLOW-UP VISIT: ICD-10-PCS | Mod: S$GLB,,, | Performed by: UROLOGY

## 2023-11-08 PROCEDURE — 99024 POSTOP FOLLOW-UP VISIT: CPT | Mod: S$GLB,,, | Performed by: UROLOGY

## 2023-11-08 RX ORDER — MULTIVITAMIN
1 TABLET ORAL DAILY
COMMUNITY

## 2023-11-08 NOTE — TELEPHONE ENCOUNTER
Call was placed to patient he informed [place] he's been in traffic for about an Hour I informed him that if he cant get here before 11 that he will be seen after lunch patent stated that was fine.

## 2023-11-08 NOTE — PROGRESS NOTES
"Chief Complaint:  Infertility    HPI:    Mr. Sy is a 23 y.o.  male who has been  to his wife for the past 3 years. They have been trying to achieve a pregnancy for the past 1 years but without success. 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 He denies a history of erectile dysfunction and ejaculatory problems.    He's s/p L varicocelectomy on 10/10/23.    Lab Results   Component Value Date    TOTALTESTOST 628 2023    LABLH 2.8 2023    FSH 7.23 2023    ESTRADIOL 25 2023    PROLACTIN 7.5 2023     SA 23 (EVAN)--4.6 cc/8.55 million per cc/60%/4%  SA 23 (EVAN)--6.6 cc/7.2 million per cc/41%/4%    FOR REVIEW FROM PREVIOUS:    Mr. Sy is a 23 y.o.  male who has been  to his wife for the past 3 years. They have been trying to achieve a pregnancy for the past 1 years but without success. 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 He denies a history of erectile dysfunction and ejaculatory problems.    He has achieved 0 pregnancies in the past.    SA 23 (EVAN)--4.6 cc/8.55 million per cc/60%/4%  SA 23 (EVAN)--6.6 cc/7.2 million per cc/41%/4%    Sammie Sy is 24 years old. ( 99) Her menses are regular. She has not undergone prior hysterosalpingogram. She has achieved 0 prior pregnancies.  She sees Dr. Bailey.    The couple has not undergone prior intrauterine insemination procedures.    The couple has not undergone prior in-vitro fertilization procedures.    Zhao Sy denies a history of exposure to harmful chemicals, toxins, and radiation.    No history of recent fevers greater than 101.5 degrees Farenheit.    No history of recent exposure to "wet heat."    No history of urological trauma or testicular torsion.    No history of prostatitis, epididymitis, and " orchitis.    No history of post-pubertal mumps.    There is no known family history of fertility problems.    REVIEW OF SYSTEMS:     He denies headache, blurred vision, fever, nausea, vomiting, chills, abdominal pain, chest pain, sore throat, bleeding per rectum, cough, SOB, recent loss of consciousness, recent mental status changes, seizures, dizziness, or upper or lower extremity weakness.    PHYSICAL EXAM:     The patient generally appears in good health, is appropriately interactive, and is in no apparent distress.     Eyes: anicteric sclerae, moist conjunctivae; no lid-lag; PERRLA     HENT: Atraumatic; oropharynx clear with moist mucous membranes and no mucosal ulcerations;normal hard and soft palate.  No evidence of lymphadenopathy.    Neck: Trachea midline.  No thyromegaly.    Skin: No lesions.    Mental: Cooperative with normal affect.  Is oriented to time, place, and person.    Neuro: Grossly intact.    Chest: Normal inspiratory effort.   No accessory muscles.  No audible wheezes.  Respirations symmetric on inspiration and expiration.    Heart: Regular rhythm.      Abdomen:  Soft, non-tender. No masses or organomegaly. Bladder is not palpable. No evidence of flank discomfort. No evidence of inguinal hernia.    Genitourinary: Penis is normal with no evidence of plaques or induration. Urethral meatus is normal. Scrotum is normal. Testes are descended bilaterally with no evidence of abnormal masses or tenderness. Epididymis, vas deferens, and cord structures are normal bilaterally.  Testicular volume is approximately 18 cc on the R and 17 cc on the L.      Extremities: No cyanosis, clubbing, or edema.    IMPRESSION & PLAN:    Mr. Sy is a 23 y.o.  male who has been  to his wife for the past 3 years. They have been trying to achieve a pregnancy for the past 1 years but without success. 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 He denies a history of erectile dysfunction and ejaculatory problems.    He's s/p L varicocelectomy on 10/10/23.    Lab Results   Component Value Date    TOTALTESTOST 628 09/27/2023    LABLH 2.8 09/27/2023    FSH 7.23 09/27/2023    ESTRADIOL 25 09/27/2023    PROLACTIN 7.5 09/27/2023     SA 7/20/23 (EVAN)--4.6 cc/8.55 million per cc/60%/4%  SA 9/12/23 (EVAN)--6.6 cc/7.2 million per cc/41%/4%    He a L grade II varicocele.    1.  Independently interpreted his outside SA's today.   2.  RTC 3 months with a SA.  3.  Recommend avoiding "wet heat."  4.  Recommend taking a multivitamin and 500 mg of vitamin c daily in addition to the multivitamin.      CC: Lynn      "

## 2023-11-08 NOTE — TELEPHONE ENCOUNTER
----- Message from Cristina Perez sent at 11/8/2023  9:08 AM CST -----  Regarding: Post Op Pt Late?  Contact: [phone]  Hi, pt called to say he is in traffic and maybe late today. [phone]

## 2023-11-08 NOTE — LETTER
November 8, 2023        Perez Bailey MD  [address] [place] [address] Dr Yasmany MAKI [id number]             [facility]  [address]  [facility] [address]  Phone: [phone]   Patient: Zhao Sy   MR Number: [id number]   YOB: 2000   Date of Visit: 11/8/2023       Dear Dr. Bailey:    Thank you for referring Zhao Sy to me for evaluation. Attached you will find relevant portions of my assessment and plan of care.    If you have questions, please do not hesitate to call me. I look forward to following Zhao Sy along with you.    Sincerely,      Igor Fortune MD            CC  No Recipients    Enclosure

## 2024-01-09 ENCOUNTER — TELEPHONE (OUTPATIENT)
Dept: UROLOGY | Facility: CLINIC | Age: 24
End: 2024-01-09
Payer: COMMERCIAL

## 2024-01-09 NOTE — TELEPHONE ENCOUNTER
Call was placed back to patient informing him we received the results and he will need a follow up appointment for the provider to go over the results with him. Luigi states he stay too far will radha results mailed out to him in instead.

## 2024-01-09 NOTE — TELEPHONE ENCOUNTER
----- Message from Kyra Bonner sent at 1/9/2024 11:11 AM CST -----  Regarding: Test Results  Contact: Pt @  [phone]  Pt is calling to get test results from a test he took last week. Asking for a call back

## 2024-02-19 ENCOUNTER — OFFICE VISIT (OUTPATIENT)
Dept: UROLOGY | Facility: CLINIC | Age: 24
End: 2024-02-19
Payer: COMMERCIAL

## 2024-02-19 VITALS
DIASTOLIC BLOOD PRESSURE: 81 MMHG | WEIGHT: 167 LBS | BODY MASS INDEX: 23.91 KG/M2 | HEIGHT: 70 IN | SYSTOLIC BLOOD PRESSURE: 124 MMHG | HEART RATE: 65 BPM

## 2024-02-19 DIAGNOSIS — E29.1 TESTICULAR FAILURE: Primary | ICD-10-CM

## 2024-02-19 PROCEDURE — 3074F SYST BP LT 130 MM HG: CPT | Mod: CPTII,S$GLB,, | Performed by: UROLOGY

## 2024-02-19 PROCEDURE — 99999 PR PBB SHADOW E&M-EST. PATIENT-LVL III: CPT | Mod: PBBFAC,,, | Performed by: UROLOGY

## 2024-02-19 PROCEDURE — 1160F RVW MEDS BY RX/DR IN RCRD: CPT | Mod: CPTII,S$GLB,, | Performed by: UROLOGY

## 2024-02-19 PROCEDURE — 99214 OFFICE O/P EST MOD 30 MIN: CPT | Mod: S$GLB,,, | Performed by: UROLOGY

## 2024-02-19 PROCEDURE — 1159F MED LIST DOCD IN RCRD: CPT | Mod: CPTII,S$GLB,, | Performed by: UROLOGY

## 2024-02-19 PROCEDURE — 3008F BODY MASS INDEX DOCD: CPT | Mod: CPTII,S$GLB,, | Performed by: UROLOGY

## 2024-02-19 PROCEDURE — 3079F DIAST BP 80-89 MM HG: CPT | Mod: CPTII,S$GLB,, | Performed by: UROLOGY

## 2024-02-19 NOTE — PROGRESS NOTES
"Chief Complaint:  Infertility    HPI:    Mr. Sy is a 24 y.o.  male who has been  to his wife for the past 3 years. They have been trying to achieve a pregnancy for the past 1 years but without success. 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 He denies a history of erectile dysfunction and ejaculatory problems.    He's s/p L varicocelectomy on 10/10/23.  Post op SA shows improved concentration.    Lab Results   Component Value Date    TOTALTESTOST 628 2023    LABLH 2.8 2023    FSH 7.23 2023    ESTRADIOL 25 2023    PROLACTIN 7.5 2023     SA 23 (EVAN)--4.6 cc/8.55 million per cc/60%/4%  SA 23 (EVAN)--6.6 cc/7.2 million per cc/41%/4%  SA 24 (EVAN)--5.3 cc/13.6 million per cc/34%/4%    FOR REVIEW FROM PREVIOUS:    Mr. Sy is a 23 y.o.  male who has been  to his wife for the past 3 years. They have been trying to achieve a pregnancy for the past 1 years but without success. 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 He denies a history of erectile dysfunction and ejaculatory problems.    He has achieved 0 pregnancies in the past.    SA 23 (EVAN)--4.6 cc/8.55 million per cc/60%/4%  SA 23 (EVAN)--6.6 cc/7.2 million per cc/41%/4%    Sammie Sy is 22 years old. ( 99) Her menses are regular. She has not undergone prior hysterosalpingogram. She has achieved 0 prior pregnancies.  She sees Dr. Bailey.    The couple has not undergone prior intrauterine insemination procedures.    The couple has not undergone prior in-vitro fertilization procedures.    Zhao Yossi denies a history of exposure to harmful chemicals, toxins, and radiation.    No history of recent fevers greater than 101.5 degrees Farenheit.    No history of recent exposure to "wet heat."    No history of " urological trauma or testicular torsion.    No history of prostatitis, epididymitis, and orchitis.    No history of post-pubertal mumps.    There is no known family history of fertility problems.    REVIEW OF SYSTEMS:     He denies headache, blurred vision, fever, nausea, vomiting, chills, abdominal pain, chest pain, sore throat, bleeding per rectum, cough, SOB, recent loss of consciousness, recent mental status changes, seizures, dizziness, or upper or lower extremity weakness.    PHYSICAL EXAM:     The patient generally appears in good health, is appropriately interactive, and is in no apparent distress.     Eyes: anicteric sclerae, moist conjunctivae; no lid-lag; PERRLA     HENT: Atraumatic; oropharynx clear with moist mucous membranes and no mucosal ulcerations;normal hard and soft palate.  No evidence of lymphadenopathy.    Neck: Trachea midline.  No thyromegaly.    Skin: No lesions.    Mental: Cooperative with normal affect.  Is oriented to time, place, and person.    Neuro: Grossly intact.    Chest: Normal inspiratory effort.   No accessory muscles.  No audible wheezes.  Respirations symmetric on inspiration and expiration.    Heart: Regular rhythm.      Abdomen:  Soft, non-tender. No masses or organomegaly. Bladder is not palpable. No evidence of flank discomfort. No evidence of inguinal hernia.    Genitourinary: Penis is normal with no evidence of plaques or induration. Urethral meatus is normal. Scrotum is normal. Testes are descended bilaterally with no evidence of abnormal masses or tenderness. Epididymis, vas deferens, and cord structures are normal bilaterally.  Testicular volume is approximately 18 cc on the R and 17 cc on the L.      Extremities: No cyanosis, clubbing, or edema.    IMPRESSION & PLAN:    Mr. Sy is a 24 y.o.  male who has been  to his wife for the past 3 years. They have been trying to achieve a pregnancy for the past 1 years but without success. 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 He denies a history of erectile dysfunction and ejaculatory problems.    He's s/p L varicocelectomy on 10/10/23.  Post op SA shows improved concentration.    Lab Results   Component Value Date    TOTALTESTOST 628 09/27/2023    LABLH 2.8 09/27/2023    FSH 7.23 09/27/2023    ESTRADIOL 25 09/27/2023    PROLACTIN 7.5 09/27/2023     SA 7/20/23 (EVAN)--4.6 cc/8.55 million per cc/60%/4%  SA 9/12/23 (EVAN)--6.6 cc/7.2 million per cc/41%/4%  SA 1/2/24 (EVAN)--5.3 cc/13.6 million per cc/34%/4%        1.  Independently interpreted his labs and outside SA's today.   2.  From a sanchez factor efforts with natural means, IUI, and IVF are all appropriate.  3.  Recommend avoiding "wet heat."  4.  Recommend taking a multivitamin and 500 mg of vitamin c daily in addition to the multivitamin.  5. RTC in 6 months if not pregnant by then.      CC: Lynn      "

## (undated) DEVICE — DRESSING TELFA PAD N ADH 2X3

## (undated) DEVICE — CATH IV INTROCAN 24G X 3/4

## (undated) DEVICE — TOWEL OR DISP STRL BLUE 4/PK

## (undated) DEVICE — BLADE CLIPPER SENICLIP SURGICA

## (undated) DEVICE — SUT VICRYL 3-0 27 SH

## (undated) DEVICE — GOWN SURGICAL X-LARGE

## (undated) DEVICE — BAG BANDED 10X10IN

## (undated) DEVICE — NDL HYPO REG 25G X 1 1/2

## (undated) DEVICE — ELECTRODE REM PLYHSV RETURN 9

## (undated) DEVICE — SUT MONOCRYL 4-0 PS-2

## (undated) DEVICE — DRAPE STERI INSTRUMENT 1018

## (undated) DEVICE — DRESSING TRANS 2X2 TEGADERM

## (undated) DEVICE — SUT 4-0 12-30IN SILK

## (undated) DEVICE — PANTIES FEMININE NAPKIN LG/XLG

## (undated) DEVICE — SUT SILK 3-0 STRANDS 30IN

## (undated) DEVICE — GLOVE SURG BIOGEL LATEX SZ 7.5

## (undated) DEVICE — CLOSURE SKIN STERI STRIP 1/4X4

## (undated) DEVICE — SUT 0 18IN SILK BLK BRAIDE

## (undated) DEVICE — DRAPE LAP T SHT W/ INSTR PAD

## (undated) DEVICE — BANDAGE GAUZE 6PLY FLUFF 2X3

## (undated) DEVICE — DRAPE STERI-DRAPE 1000 17X11IN

## (undated) DEVICE — DRESSING TRANS 4X4 TEGADERM

## (undated) DEVICE — DRAPE HALF SURGICAL 40X58IN

## (undated) DEVICE — TRAY MINOR GEN SURG OMC

## (undated) DEVICE — CORD BIPOLAR 12 FOOT

## (undated) DEVICE — FORCEP STRAIGHT DISP

## (undated) DEVICE — DRAIN PENROSE XRAY 12 X 1/4 ST